# Patient Record
Sex: MALE | Race: WHITE | NOT HISPANIC OR LATINO | Employment: FULL TIME | URBAN - METROPOLITAN AREA
[De-identification: names, ages, dates, MRNs, and addresses within clinical notes are randomized per-mention and may not be internally consistent; named-entity substitution may affect disease eponyms.]

---

## 2017-03-24 ENCOUNTER — RECORDS - HEALTHEAST (OUTPATIENT)
Dept: LAB | Facility: CLINIC | Age: 52
End: 2017-03-24

## 2017-03-24 LAB
CHOLEST SERPL-MCNC: 186 MG/DL
FASTING STATUS PATIENT QL REPORTED: YES
HDLC SERPL-MCNC: 75 MG/DL
LDLC SERPL CALC-MCNC: 98 MG/DL
TRIGL SERPL-MCNC: 65 MG/DL

## 2019-01-18 ENCOUNTER — RECORDS - HEALTHEAST (OUTPATIENT)
Dept: LAB | Facility: CLINIC | Age: 54
End: 2019-01-18

## 2019-01-18 LAB
ALBUMIN SERPL-MCNC: 4.2 G/DL (ref 3.5–5)
ALP SERPL-CCNC: 43 U/L (ref 45–120)
ALT SERPL W P-5'-P-CCNC: 49 U/L (ref 0–45)
ANION GAP SERPL CALCULATED.3IONS-SCNC: 11 MMOL/L (ref 5–18)
AST SERPL W P-5'-P-CCNC: 29 U/L (ref 0–40)
BILIRUB SERPL-MCNC: 1 MG/DL (ref 0–1)
BUN SERPL-MCNC: 15 MG/DL (ref 8–22)
CALCIUM SERPL-MCNC: 10 MG/DL (ref 8.5–10.5)
CHLORIDE BLD-SCNC: 103 MMOL/L (ref 98–107)
CHOLEST SERPL-MCNC: 234 MG/DL
CO2 SERPL-SCNC: 24 MMOL/L (ref 22–31)
CREAT SERPL-MCNC: 1.02 MG/DL (ref 0.7–1.3)
FASTING STATUS PATIENT QL REPORTED: NO
GFR SERPL CREATININE-BSD FRML MDRD: >60 ML/MIN/1.73M2
GLUCOSE BLD-MCNC: 96 MG/DL (ref 70–125)
HDLC SERPL-MCNC: 81 MG/DL
LDLC SERPL CALC-MCNC: 130 MG/DL
POTASSIUM BLD-SCNC: 4.2 MMOL/L (ref 3.5–5)
PROT SERPL-MCNC: 7.6 G/DL (ref 6–8)
SODIUM SERPL-SCNC: 138 MMOL/L (ref 136–145)
TRIGL SERPL-MCNC: 115 MG/DL

## 2019-08-13 ENCOUNTER — RECORDS - HEALTHEAST (OUTPATIENT)
Dept: ADMINISTRATIVE | Facility: OTHER | Age: 54
End: 2019-08-13

## 2019-08-21 ENCOUNTER — HOSPITAL ENCOUNTER (OUTPATIENT)
Dept: CT IMAGING | Facility: CLINIC | Age: 54
Discharge: HOME | End: 2019-08-21
Attending: FAMILY MEDICINE

## 2019-08-21 ENCOUNTER — COMMUNICATION - HEALTHEAST (OUTPATIENT)
Dept: TELEHEALTH | Facility: CLINIC | Age: 54
End: 2019-08-21

## 2019-08-21 DIAGNOSIS — I71.20 THORACIC AORTIC ANEURYSM WITHOUT RUPTURE (H): ICD-10-CM

## 2020-07-22 ENCOUNTER — RECORDS - HEALTHEAST (OUTPATIENT)
Dept: LAB | Facility: CLINIC | Age: 55
End: 2020-07-22

## 2020-07-22 LAB
ALBUMIN SERPL-MCNC: 4.5 G/DL (ref 3.5–5)
ALP SERPL-CCNC: 45 U/L (ref 45–120)
ALT SERPL W P-5'-P-CCNC: 31 U/L (ref 0–45)
ANION GAP SERPL CALCULATED.3IONS-SCNC: 12 MMOL/L (ref 5–18)
AST SERPL W P-5'-P-CCNC: 26 U/L (ref 0–40)
BILIRUB SERPL-MCNC: 0.7 MG/DL (ref 0–1)
BUN SERPL-MCNC: 16 MG/DL (ref 8–22)
CALCIUM SERPL-MCNC: 9.6 MG/DL (ref 8.5–10.5)
CHLORIDE BLD-SCNC: 103 MMOL/L (ref 98–107)
CHOLEST SERPL-MCNC: 238 MG/DL
CO2 SERPL-SCNC: 24 MMOL/L (ref 22–31)
CREAT SERPL-MCNC: 0.95 MG/DL (ref 0.7–1.3)
FASTING STATUS PATIENT QL REPORTED: YES
GFR SERPL CREATININE-BSD FRML MDRD: >60 ML/MIN/1.73M2
GLUCOSE BLD-MCNC: 95 MG/DL (ref 70–125)
HDLC SERPL-MCNC: 99 MG/DL
LDLC SERPL CALC-MCNC: 128 MG/DL
POTASSIUM BLD-SCNC: 4.2 MMOL/L (ref 3.5–5)
PROT SERPL-MCNC: 7.6 G/DL (ref 6–8)
SODIUM SERPL-SCNC: 139 MMOL/L (ref 136–145)
TRIGL SERPL-MCNC: 55 MG/DL

## 2020-08-24 ENCOUNTER — RECORDS - HEALTHEAST (OUTPATIENT)
Dept: LAB | Facility: CLINIC | Age: 55
End: 2020-08-24

## 2020-08-24 LAB
ALBUMIN SERPL-MCNC: 4.4 G/DL (ref 3.5–5)
ALP SERPL-CCNC: 44 U/L (ref 45–120)
ALT SERPL W P-5'-P-CCNC: 33 U/L (ref 0–45)
ANION GAP SERPL CALCULATED.3IONS-SCNC: 12 MMOL/L (ref 5–18)
AST SERPL W P-5'-P-CCNC: 28 U/L (ref 0–40)
BILIRUB SERPL-MCNC: 1 MG/DL (ref 0–1)
BUN SERPL-MCNC: 11 MG/DL (ref 8–22)
CALCIUM SERPL-MCNC: 9.8 MG/DL (ref 8.5–10.5)
CHLORIDE BLD-SCNC: 103 MMOL/L (ref 98–107)
CO2 SERPL-SCNC: 23 MMOL/L (ref 22–31)
CREAT SERPL-MCNC: 0.98 MG/DL (ref 0.7–1.3)
GFR SERPL CREATININE-BSD FRML MDRD: >60 ML/MIN/1.73M2
GLUCOSE BLD-MCNC: 104 MG/DL (ref 70–125)
POTASSIUM BLD-SCNC: 3.9 MMOL/L (ref 3.5–5)
PROT SERPL-MCNC: 7.7 G/DL (ref 6–8)
SODIUM SERPL-SCNC: 138 MMOL/L (ref 136–145)
TSH SERPL DL<=0.005 MIU/L-ACNC: 47.51 UIU/ML (ref 0.3–5)

## 2020-08-25 ENCOUNTER — RECORDS - HEALTHEAST (OUTPATIENT)
Dept: LAB | Facility: CLINIC | Age: 55
End: 2020-08-25

## 2020-08-25 LAB
T3FREE SERPL-MCNC: 2.1 PG/ML (ref 1.9–3.9)
T4 FREE SERPL-MCNC: 0.5 NG/DL (ref 0.7–1.8)

## 2020-11-04 ENCOUNTER — RECORDS - HEALTHEAST (OUTPATIENT)
Dept: LAB | Facility: CLINIC | Age: 55
End: 2020-11-04

## 2020-11-04 LAB
ALBUMIN SERPL-MCNC: 4.3 G/DL (ref 3.5–5)
ALP SERPL-CCNC: 41 U/L (ref 45–120)
ALT SERPL W P-5'-P-CCNC: 55 U/L (ref 0–45)
ANION GAP SERPL CALCULATED.3IONS-SCNC: 11 MMOL/L (ref 5–18)
AST SERPL W P-5'-P-CCNC: 32 U/L (ref 0–40)
BILIRUB SERPL-MCNC: 0.8 MG/DL (ref 0–1)
BUN SERPL-MCNC: 16 MG/DL (ref 8–22)
CALCIUM SERPL-MCNC: 9.8 MG/DL (ref 8.5–10.5)
CHLORIDE BLD-SCNC: 102 MMOL/L (ref 98–107)
CO2 SERPL-SCNC: 27 MMOL/L (ref 22–31)
CREAT SERPL-MCNC: 0.98 MG/DL (ref 0.7–1.3)
GFR SERPL CREATININE-BSD FRML MDRD: >60 ML/MIN/1.73M2
GLUCOSE BLD-MCNC: 104 MG/DL (ref 70–125)
POTASSIUM BLD-SCNC: 4.4 MMOL/L (ref 3.5–5)
PROT SERPL-MCNC: 7.5 G/DL (ref 6–8)
SODIUM SERPL-SCNC: 140 MMOL/L (ref 136–145)
TSH SERPL DL<=0.005 MIU/L-ACNC: 0.1 UIU/ML (ref 0.3–5)

## 2020-12-16 ENCOUNTER — RECORDS - HEALTHEAST (OUTPATIENT)
Dept: LAB | Facility: CLINIC | Age: 55
End: 2020-12-16

## 2020-12-16 LAB — TSH SERPL DL<=0.005 MIU/L-ACNC: 0.36 UIU/ML (ref 0.3–5)

## 2021-05-25 ENCOUNTER — RECORDS - HEALTHEAST (OUTPATIENT)
Dept: ADMINISTRATIVE | Facility: CLINIC | Age: 56
End: 2021-05-25

## 2021-06-01 ENCOUNTER — RECORDS - HEALTHEAST (OUTPATIENT)
Dept: ADMINISTRATIVE | Facility: CLINIC | Age: 56
End: 2021-06-01

## 2021-06-09 ENCOUNTER — RECORDS - HEALTHEAST (OUTPATIENT)
Dept: LAB | Facility: CLINIC | Age: 56
End: 2021-06-09

## 2021-06-09 LAB — TSH SERPL DL<=0.005 MIU/L-ACNC: 0.71 UIU/ML (ref 0.3–5)

## 2021-06-20 ENCOUNTER — HEALTH MAINTENANCE LETTER (OUTPATIENT)
Age: 56
End: 2021-06-20

## 2021-10-11 ENCOUNTER — HEALTH MAINTENANCE LETTER (OUTPATIENT)
Age: 56
End: 2021-10-11

## 2022-03-25 ENCOUNTER — LAB REQUISITION (OUTPATIENT)
Dept: LAB | Facility: CLINIC | Age: 57
End: 2022-03-25

## 2022-03-25 DIAGNOSIS — E78.5 HYPERLIPIDEMIA, UNSPECIFIED: ICD-10-CM

## 2022-03-25 DIAGNOSIS — E03.9 HYPOTHYROIDISM, UNSPECIFIED: ICD-10-CM

## 2022-03-25 LAB
ALBUMIN SERPL-MCNC: 4 G/DL (ref 3.5–5)
ALP SERPL-CCNC: 39 U/L (ref 45–120)
ALT SERPL W P-5'-P-CCNC: 62 U/L (ref 0–45)
ANION GAP SERPL CALCULATED.3IONS-SCNC: 13 MMOL/L (ref 5–18)
AST SERPL W P-5'-P-CCNC: 39 U/L (ref 0–40)
BILIRUB SERPL-MCNC: 0.9 MG/DL (ref 0–1)
BUN SERPL-MCNC: 11 MG/DL (ref 8–22)
CALCIUM SERPL-MCNC: 9.8 MG/DL (ref 8.5–10.5)
CHLORIDE BLD-SCNC: 102 MMOL/L (ref 98–107)
CHOLEST SERPL-MCNC: 199 MG/DL
CO2 SERPL-SCNC: 24 MMOL/L (ref 22–31)
CREAT SERPL-MCNC: 0.94 MG/DL (ref 0.7–1.3)
FASTING STATUS PATIENT QL REPORTED: NORMAL
GFR SERPL CREATININE-BSD FRML MDRD: >90 ML/MIN/1.73M2
GLUCOSE BLD-MCNC: 100 MG/DL (ref 70–125)
HDLC SERPL-MCNC: 73 MG/DL
LDLC SERPL CALC-MCNC: 111 MG/DL
POTASSIUM BLD-SCNC: 3.7 MMOL/L (ref 3.5–5)
PROT SERPL-MCNC: 7.3 G/DL (ref 6–8)
SODIUM SERPL-SCNC: 139 MMOL/L (ref 136–145)
TRIGL SERPL-MCNC: 74 MG/DL
TSH SERPL DL<=0.005 MIU/L-ACNC: 2.07 UIU/ML (ref 0.3–5)

## 2022-03-25 PROCEDURE — 80053 COMPREHEN METABOLIC PANEL: CPT | Performed by: PHYSICIAN ASSISTANT

## 2022-03-25 PROCEDURE — 84443 ASSAY THYROID STIM HORMONE: CPT | Performed by: PHYSICIAN ASSISTANT

## 2022-03-25 PROCEDURE — 80061 LIPID PANEL: CPT | Performed by: PHYSICIAN ASSISTANT

## 2022-04-13 ENCOUNTER — OFFICE VISIT (OUTPATIENT)
Dept: CARDIOLOGY | Facility: CLINIC | Age: 57
End: 2022-04-13
Payer: COMMERCIAL

## 2022-04-13 VITALS
SYSTOLIC BLOOD PRESSURE: 114 MMHG | HEART RATE: 64 BPM | HEIGHT: 67 IN | WEIGHT: 241.8 LBS | BODY MASS INDEX: 37.95 KG/M2 | DIASTOLIC BLOOD PRESSURE: 82 MMHG | RESPIRATION RATE: 12 BRPM

## 2022-04-13 DIAGNOSIS — I71.20 THORACIC AORTIC ANEURYSM WITHOUT RUPTURE (H): Primary | ICD-10-CM

## 2022-04-13 PROCEDURE — 99204 OFFICE O/P NEW MOD 45 MIN: CPT | Performed by: INTERNAL MEDICINE

## 2022-04-13 RX ORDER — MULTIVIT WITH MINERALS/LUTEIN
1000 TABLET ORAL DAILY
COMMUNITY
Start: 2021-01-01

## 2022-04-13 RX ORDER — METOPROLOL SUCCINATE 25 MG/1
25 TABLET, EXTENDED RELEASE ORAL DAILY
Qty: 30 TABLET | Refills: 3 | Status: SHIPPED | OUTPATIENT
Start: 2022-04-13 | End: 2022-07-25

## 2022-04-13 RX ORDER — LEVOTHYROXINE SODIUM 125 UG/1
125 TABLET ORAL DAILY
COMMUNITY
Start: 2022-03-25

## 2022-04-13 RX ORDER — HYDROXYZINE HYDROCHLORIDE 50 MG/1
50 TABLET, FILM COATED ORAL PRN
COMMUNITY
Start: 2022-03-25

## 2022-04-13 RX ORDER — DIMENHYDRINATE 50 MG
100 TABLET ORAL DAILY
COMMUNITY

## 2022-04-13 RX ORDER — SIMVASTATIN 40 MG
40 TABLET ORAL DAILY
COMMUNITY
Start: 2022-03-25

## 2022-04-13 RX ORDER — HYDROCHLOROTHIAZIDE 12.5 MG/1
12.5 TABLET ORAL DAILY
COMMUNITY
Start: 2022-03-25

## 2022-04-13 RX ORDER — CHOLECALCIFEROL (VITAMIN D3) 50 MCG
2000 CAPSULE ORAL DAILY
COMMUNITY
Start: 2021-01-01

## 2022-04-13 NOTE — PATIENT INSTRUCTIONS
Raciel JUNG Dejuan,     It was a pleasure to see you in the office today. My recommendations for you include:   1. echocardiogram  2. Start low dose beta blocker toprol  3. Referral to surgeon     Please do not hesitate to call the [facility] clinic with any questions or concerns at [phone].    Sincerely,     Cristiana Jean MD

## 2022-04-13 NOTE — LETTER
4/13/2022    Sean Mendes PA-C  [facility] [address] [place] [address] [address] [place] [address]    RE: Raciel Zaidi       Dear Colleague,     I had the pleasure of seeing Raciel Zaidi in the [facility].    HEART CARE ENCOUNTER CONSULTATON NOTE      [facility]  [phone]      Assessment/Recommendations   Assessment:  1.  Ascending aortic aneurysm: Now measuring over 5.5 cm in certain areas with a significant increase in size in comparison to prior CT chest done in 2019.  2.  Hypertension: Fairly well controlled however would like tighter control given his aneurysm and will add beta-blocker      Plan:  1.  Need to obtain the CT report from outside radiology for review  2.  Referral to CV surgery for evaluation and management.  May need to consider repair of aneurysm at this time given increase in size over the past couple years  3.  Echocardiogram to evaluate for valvular disease  4.  Add Toprol-XL 25 mg daily  5.  Avoid strenuous exercise       History of Present Illness/Subjective    HPI: Raciel Zaidi is a 56 year old male with history of ascending aortic aneurysm, hypertension, obesity, hypothyroidism and dyslipidemia who I am seeing today for initial consultation due to an abnormal CT chest.  I did not have the report available to me during the visit however the patient was able to access it on his phone.  He had the CT scan done a few years ago and they compared it to one that was done in 2017.  This was done at [facility] radiology in [place].   Mid ascending aorta diameter was 5.4 x 4.7 cm (previous 4.8 x 4.6), proximal ascending 5.3 x 5.7 cm (previous 4.2 x 5.3).  No known aortic valvular disease he does not exercise much.  No exertional symptoms.  His blood pressure is fairly well controlled on a low-dose of hydrochlorothiazide.         Physical Examination  Review of Systems   Vitals: /82 (BP Location: Left arm, Patient Position: Sitting, Cuff Size: Adult  "Large)   Pulse 64   Resp 12   Ht 1.702 m (5' 7\")   Wt 109.7 kg (241 lb 12.8 oz)   BMI 37.87 kg/m    BMI= Body mass index is 37.87 kg/m .  Wt Readings from Last 3 Encounters:   04/13/22 109.7 kg (241 lb 12.8 oz)       General Appearance:   no distress, normal body habitus   ENT/Mouth: membranes moist, no oral lesions or bleeding gums.      EYES:  no scleral icterus, normal conjunctivae   Neck: no carotid bruits or thyromegaly   Chest/Lungs:   lungs are clear to auscultation, no rales or wheezing   Cardiovascular:   Regular. Normal first and second heart sounds with no murmurs, rubs, or gallops; the carotid, radial and posterior tibial pulses are intact,  no edema bilaterally    Abdomen:  no organomegaly, masses, bruits, or tenderness; bowel sounds are present   Extremities: no cyanosis or clubbing   Skin: no xanthelasma, warm.    Neurologic: normal  bilateral, no tremors     Psychiatric: alert and oriented x3, calm        Please refer above for cardiac ROS details.        Medical History  Surgical History Family History Social History   Hypertension     obesity     Aortic aneurysm No past surgical history on file.    Maternal grandfather had heart attack in his 60s, paternal grandfather heart disease in 70s.  Mother CVA in her 60s   Social History     Socioeconomic History     Marital status:      Spouse name: Not on file     Number of children: Not on file     Years of education: Not on file     Highest education level: Not on file   Occupational History     Not on file   Tobacco Use     Smoking status: Never Smoker     Smokeless tobacco: Never Used   Substance and Sexual Activity     Alcohol use: Not on file     Drug use: Never     Sexual activity: Not on file   Other Topics Concern     Not on file   Social History Narrative     Not on file     Social Determinants of Health     Financial Resource Strain: Not on file   Food Insecurity: Not on file   Transportation Needs: Not on file   Physical " Activity: Not on file   Stress: Not on file   Social Connections: Not on file   Intimate Partner Violence: Not on file   Housing Stability: Not on file           Medications  Allergies   Current Outpatient Medications   Medication Sig Dispense Refill     cholecalciferol (D3 SUPER STRENGTH) 50 MCG (2000 UT) CAPS Take 2,000 Units by mouth daily       Cod Liver Oil 1000 MG CAPS Take 1,000 mg by mouth daily       coenzyme Q-10 100 MG CAPS Take 100 mg by mouth daily       hydrochlorothiazide (HYDRODIURIL) 12.5 MG tablet Take 12.5 mg by mouth daily       hydrOXYzine (ATARAX) 50 MG tablet Take 50 mg by mouth as needed       levothyroxine (SYNTHROID/LEVOTHROID) 125 MCG tablet Take 125 mcg by mouth daily       metoprolol succinate ER (TOPROL XL) 25 MG 24 hr tablet Take 1 tablet (25 mg) by mouth daily 30 tablet 3     Multiple Vitamin (MULTI VITAMIN) TABS Take 1 tablet by mouth daily       MULTIVIT &MINERALS/FERROUS FUM (MULTI VITAMIN ORAL) [MULTIVIT &MINERALS/FERROUS FUM (MULTI VITAMIN ORAL)] Take by mouth.       simvastatin (ZOCOR) 40 MG tablet Take 40 mg by mouth daily       vitamin C (ASCORBIC ACID) 1000 MG TABS Take 1,000 mg by mouth daily       zolpidem (AMBIEN) 10 mg tablet [ZOLPIDEM (AMBIEN) 10 MG TABLET] Take 10 mg by mouth bedtime as needed for sleep.         Allergies   Allergen Reactions     Cat Dander [Animal Dander] Other (See Comments)     Itchy eyes, runny nose, scratchy throat.     Ragweed [Ragweeds] Other (See Comments)     Itchy eyes, runny nose, scratchy throat.          Lab Results    Chemistry/lipid CBC Cardiac Enzymes/BNP/TSH/INR   Recent Labs   Lab Test 03/25/22  1116   CHOL 199   HDL 73      TRIG 74     Recent Labs   Lab Test 03/25/22  1116 07/22/20  0901 01/18/19  1131    128 130*     Recent Labs   Lab Test 03/25/22  1116      POTASSIUM 3.7   CHLORIDE 102   CO2 24      BUN 11   CR 0.94   GFRESTIMATED >90   JONATHAN 9.8     Recent Labs   Lab Test 03/25/22  1116 11/04/20  1031  08/24/20  0856   CR 0.94 0.98 0.98     No results for input(s): A1C in the last 41072 hours.       No results for input(s): WBC, HGB, HCT, MCV, PLT in the last 15271 hours.  No results for input(s): HGB in the last 64214 hours. No results for input(s): TROPONINI in the last 37681 hours.  No results for input(s): BNP, NTBNPI, NTBNP in the last 00074 hours.  Recent Labs   Lab Test 03/25/22  1116   TSH 2.07     No results for input(s): INR in the last 66073 hours.         Thank you for allowing me to participate in the care of your patient.    Sincerely,     Cristiana Jean MD     [facility]

## 2022-04-13 NOTE — PROGRESS NOTES
"  HEART CARE ENCOUNTER CONSULTATON NOTE      [facility]  [phone]      Assessment/Recommendations   Assessment:  1.  Ascending aortic aneurysm: Now measuring over 5.5 cm in certain areas with a significant increase in size in comparison to prior CT chest done in 2019.  2.  Hypertension: Fairly well controlled however would like tighter control given his aneurysm and will add beta-blocker      Plan:  1.  Need to obtain the CT report from outside radiology for review  2.  Referral to CV surgery for evaluation and management.  May need to consider repair of aneurysm at this time given increase in size over the past couple years  3.  Echocardiogram to evaluate for valvular disease  4.  Add Toprol-XL 25 mg daily  5.  Avoid strenuous exercise       History of Present Illness/Subjective    HPI: Raciel Zaidi is a 56 year old male with history of ascending aortic aneurysm, hypertension, obesity, hypothyroidism and dyslipidemia who I am seeing today for initial consultation due to an abnormal CT chest.  I did not have the report available to me during the visit however the patient was able to access it on his phone.  He had the CT scan done a few years ago and they compared it to one that was done in 2017.  [facility] [place].   Mid ascending aorta diameter was 5.4 x 4.7 cm (previous 4.8 x 4.6), proximal ascending 5.3 x 5.7 cm (previous 4.2 x 5.3).  No known aortic valvular disease he does not exercise much.  No exertional symptoms.  His blood pressure is fairly well controlled on a low-dose of hydrochlorothiazide.         Physical Examination  Review of Systems   Vitals: /82 (BP Location: Left arm, Patient Position: Sitting, Cuff Size: Adult Large)   Pulse 64   Resp 12   Ht 1.702 m (5' 7\")   Wt 109.7 kg (241 lb 12.8 oz)   BMI 37.87 kg/m    BMI= Body mass index is 37.87 kg/m .  Wt Readings from Last 3 Encounters:   04/13/22 109.7 kg (241 lb 12.8 oz)       General " Appearance:   no distress, normal body habitus   ENT/Mouth: membranes moist, no oral lesions or bleeding gums.      EYES:  no scleral icterus, normal conjunctivae   Neck: no carotid bruits or thyromegaly   Chest/Lungs:   lungs are clear to auscultation, no rales or wheezing   Cardiovascular:   Regular. Normal first and second heart sounds with no murmurs, rubs, or gallops; the carotid, radial and posterior tibial pulses are intact,  no edema bilaterally    Abdomen:  no organomegaly, masses, bruits, or tenderness; bowel sounds are present   Extremities: no cyanosis or clubbing   Skin: no xanthelasma, warm.    Neurologic: normal  bilateral, no tremors     Psychiatric: alert and oriented x3, calm        Please refer above for cardiac ROS details.        Medical History  Surgical History Family History Social History   Hypertension     obesity     Aortic aneurysm No past surgical history on file.    Maternal grandfather had heart attack in his 60s, paternal grandfather heart disease in 70s.  Mother CVA in her 60s   Social History     Socioeconomic History     Marital status:      Spouse name: Not on file     Number of children: Not on file     Years of education: Not on file     Highest education level: Not on file   Occupational History     Not on file   Tobacco Use     Smoking status: Never Smoker     Smokeless tobacco: Never Used   Substance and Sexual Activity     Alcohol use: Not on file     Drug use: Never     Sexual activity: Not on file   Other Topics Concern     Not on file   Social History Narrative     Not on file     Social Determinants of Health     Financial Resource Strain: Not on file   Food Insecurity: Not on file   Transportation Needs: Not on file   Physical Activity: Not on file   Stress: Not on file   Social Connections: Not on file   Intimate Partner Violence: Not on file   Housing Stability: Not on file           Medications  Allergies   Current Outpatient Medications   Medication Sig  Dispense Refill     cholecalciferol (D3 SUPER STRENGTH) 50 MCG (2000 UT) CAPS Take 2,000 Units by mouth daily       Cod Liver Oil 1000 MG CAPS Take 1,000 mg by mouth daily       coenzyme Q-10 100 MG CAPS Take 100 mg by mouth daily       hydrochlorothiazide (HYDRODIURIL) 12.5 MG tablet Take 12.5 mg by mouth daily       hydrOXYzine (ATARAX) 50 MG tablet Take 50 mg by mouth as needed       levothyroxine (SYNTHROID/LEVOTHROID) 125 MCG tablet Take 125 mcg by mouth daily       metoprolol succinate ER (TOPROL XL) 25 MG 24 hr tablet Take 1 tablet (25 mg) by mouth daily 30 tablet 3     Multiple Vitamin (MULTI VITAMIN) TABS Take 1 tablet by mouth daily       MULTIVIT &MINERALS/FERROUS FUM (MULTI VITAMIN ORAL) [MULTIVIT &MINERALS/FERROUS FUM (MULTI VITAMIN ORAL)] Take by mouth.       simvastatin (ZOCOR) 40 MG tablet Take 40 mg by mouth daily       vitamin C (ASCORBIC ACID) 1000 MG TABS Take 1,000 mg by mouth daily       zolpidem (AMBIEN) 10 mg tablet [ZOLPIDEM (AMBIEN) 10 MG TABLET] Take 10 mg by mouth bedtime as needed for sleep.         Allergies   Allergen Reactions     Cat Dander [Animal Dander] Other (See Comments)     Itchy eyes, runny nose, scratchy throat.     Ragweed [Ragweeds] Other (See Comments)     Itchy eyes, runny nose, scratchy throat.          Lab Results    Chemistry/lipid CBC Cardiac Enzymes/BNP/TSH/INR   Recent Labs   Lab Test 03/25/22  1116   CHOL 199   HDL 73      TRIG 74     Recent Labs   Lab Test 03/25/22  1116 07/22/20  0901 01/18/19  1131    128 130*     Recent Labs   Lab Test 03/25/22  1116      POTASSIUM 3.7   CHLORIDE 102   CO2 24      BUN 11   CR 0.94   GFRESTIMATED >90   JONATHAN 9.8     Recent Labs   Lab Test 03/25/22  1116 11/04/20  1031 08/24/20  0856   CR 0.94 0.98 0.98     No results for input(s): A1C in the last 36718 hours.       No results for input(s): WBC, HGB, HCT, MCV, PLT in the last 97497 hours.  No results for input(s): HGB in the last 40467 hours. No results  for input(s): TROPONINI in the last 19476 hours.  No results for input(s): BNP, NTBNPI, NTBNP in the last 35348 hours.  Recent Labs   Lab Test 03/25/22  1116   TSH 2.07     No results for input(s): INR in the last 51209 hours.     Cristiana Jean MD

## 2022-04-25 ENCOUNTER — HOSPITAL ENCOUNTER (OUTPATIENT)
Dept: CARDIOLOGY | Facility: CLINIC | Age: 57
Discharge: HOME | End: 2022-04-25
Attending: INTERNAL MEDICINE | Admitting: INTERNAL MEDICINE
Payer: COMMERCIAL

## 2022-04-25 DIAGNOSIS — I71.20 THORACIC AORTIC ANEURYSM WITHOUT RUPTURE (H): ICD-10-CM

## 2022-04-25 LAB — LVEF ECHO: NORMAL

## 2022-04-25 PROCEDURE — 999N000208 ECHOCARDIOGRAM COMPLETE

## 2022-04-25 PROCEDURE — 93306 TTE W/DOPPLER COMPLETE: CPT | Mod: 26 | Performed by: INTERNAL MEDICINE

## 2022-04-25 PROCEDURE — 255N000002 HC RX 255 OP 636: Performed by: INTERNAL MEDICINE

## 2022-04-25 RX ADMIN — PERFLUTREN 4 ML: 6.52 INJECTION, SUSPENSION INTRAVENOUS at 09:52

## 2022-04-28 ENCOUNTER — OFFICE VISIT (OUTPATIENT)
Dept: CARDIOLOGY | Facility: CLINIC | Age: 57
End: 2022-04-28
Attending: INTERNAL MEDICINE

## 2022-04-28 VITALS
HEART RATE: 72 BPM | SYSTOLIC BLOOD PRESSURE: 142 MMHG | RESPIRATION RATE: 16 BRPM | WEIGHT: 241 LBS | BODY MASS INDEX: 37.75 KG/M2 | DIASTOLIC BLOOD PRESSURE: 78 MMHG

## 2022-04-28 DIAGNOSIS — I71.20 THORACIC AORTIC ANEURYSM WITHOUT RUPTURE (H): ICD-10-CM

## 2022-04-28 PROCEDURE — 99203 OFFICE O/P NEW LOW 30 MIN: CPT | Performed by: THORACIC SURGERY (CARDIOTHORACIC VASCULAR SURGERY)

## 2022-04-28 RX ORDER — OMEGA-3/DHA/EPA/FISH OIL/KRILL 339-314 MG
1 CAPSULE ORAL DAILY
COMMUNITY
Start: 2021-01-01

## 2022-04-28 NOTE — PROGRESS NOTES
ASKED BY REFERRING PHYSICIAN: Dr. Jean to evaluate this patient's ascending aortic aneurysm.    CHIEF COMPLAINT: Big aorta    HPI: Raciel is a 56 year old male who presents with a documented enlarging ascending aorta.  In 2019 it was 4.8 by 4.6 cm.  It is notw 5.3 by 5.7 cm.  He has hypertension, obesity, hypothyroidism and dyslipidemia.  He does not exercise very much. He denies any chest pain or back pain other than low back pain that has been treated by a chiropractor.    PAST MEDICAL HISTORY:  Ascending aortic aneurysm  Hypertension  Obesity  Dyslipidemia  Hypothyroidism    PAST SURGICAL HISTORY:  None    FAMILY HISTORY:   A few people have  of what was called a heart attack.    SOCIAL HISTORY:  Social History     Socioeconomic History     Marital status:      Spouse name: Not on file     Number of children: Not on file     Years of education: Not on file     Highest education level: Not on file   Occupational History     Not on file   Tobacco Use     Smoking status: Never Smoker     Smokeless tobacco: Never Used   Substance and Sexual Activity     Alcohol use: Not on file     Drug use: Never     Sexual activity: Not on file   Other Topics Concern     Not on file   Social History Narrative     Not on file     Social Determinants of Health     Financial Resource Strain: Not on file   Food Insecurity: Not on file   Transportation Needs: Not on file   Physical Activity: Not on file   Stress: Not on file   Social Connections: Not on file   Intimate Partner Violence: Not on file   Housing Stability: Not on file        ALLERGIES:   Allergies   Allergen Reactions     Cat Dander [Animal Dander] Other (See Comments)     Itchy eyes, runny nose, scratchy throat.     Ragweed [Ragweeds] Other (See Comments)     Itchy eyes, runny nose, scratchy throat.       CURRENT MEDICATIONS:   Prescription Medications as of 2022       Rx Number Disp Refills Start End Last Dispensed Date Next Fill Date Owning Pharmacy     cholecalciferol (D3 SUPER STRENGTH) 50 MCG (2000 UT) CAPS    1/1/2021    [facility] [address] [place] [address] [address] [facility]    Sig: Take 2,000 Units by mouth daily    Class: Historical    Route: Oral    Cod Liver Oil 1000 MG CAPS    1/1/2021    [facility] [place] [address] [address] [facility]    Sig: Take 1,000 mg by mouth daily    Class: Historical    Route: Oral    coenzyme Q-10 100 MG CAPS        [facility] [address] [place] [address] [address] [facility]    Sig: Take 100 mg by mouth daily    Class: Historical    Route: Oral    Fish Oil-Krill Oil (MEGARED ADVANCED 4 IN 1) CAPS    1/1/2021    [facility] [place] [address] [address] [facility]    Sig: Take 1 capsule by mouth daily    Class: Historical    Route: Oral    hydrochlorothiazide (HYDRODIURIL) 12.5 MG tablet    3/25/2022    [facility] [place] [address] [address] [facility]    Sig: Take 12.5 mg by mouth daily    Class: Historical    Route: Oral    hydrOXYzine (ATARAX) 50 MG tablet    3/25/2022    [facility] [place] [address] [address] [facility]    Sig: Take 50 mg by mouth as needed    Class: Historical    Route: Oral    levothyroxine (SYNTHROID/LEVOTHROID) 125 MCG tablet    3/25/2022    [facility] [address] [place] [address] [address] [facility]    Sig: Take 125 mcg by mouth daily    Class: Historical    Route: Oral    metoprolol succinate ER (TOPROL XL) 25 MG 24 hr tablet  30 tablet 3 4/13/2022    [facility] [place] [address] [address] [facility]    Sig: Take 1 tablet (25 mg) by mouth daily    Class: E-Prescribe    Route: Oral    Multiple Vitamin (MULTI VITAMIN) TABS        [id number] - [place], [place] - [address]   AT [facility]    Sig: Take 1 tablet by mouth daily    Class: Historical    Route: Oral    simvastatin (ZOCOR) 40 MG tablet    3/25/2022    [facility] #[id number] [place] [address] [address] [facility]    Sig: Take 40 mg by mouth daily    Class: Historical    Route: Oral    vitamin C (ASCORBIC ACID) 1000 MG TABS    1/1/2021    [facility] #[id number] [place], [place] - [address] [facility]    Sig: Take 1,000 mg by mouth daily    Class: Historical    Route: Oral    zolpidem (AMBIEN) 10 mg tablet    8/24/2015        Sig: [ZOLPIDEM (AMBIEN) 10 MG TABLET] Take 10 mg by mouth bedtime as needed for sleep.    Class: Historical    Route: Oral          REVIEW OF SYSTEMS:   Gen: denies frequent headaches, double/blurry vision, insomnia, fatigue, unexplained weight loss/gain. No previous anesthesia reactions.  CV: denies chest pain, shortness of breath, palpitations, peripheral edema.    Pulm: denies shortness of breath, asthma or wheezing  GI/: denies liver or kidney problems, blood in stool or BRBPR, difficulty urinating  Endo: denies thyroid problems or Diabetes  Heme/Onc: denies bleeding or clotting disorders, no family problems with bleeding/clotting diorders  MS: no weakness, tremors or gait instability  Neuro: denies depression, memory problems, no dysesthesias, no previous strokes, no migraines, no dysphagia  Skin: No petechiae, purpura or rash.    PHYSICAL EXAMINATION:   BP (!) 142/78 (BP Location: Left arm, Patient Position: Sitting, Cuff Size: Adult Large)   Pulse 72   Resp 16   Wt 109.3 kg (241 lb)   BMI 37.75 kg/m    General: alert and oriented x 3, pleasant, no acute distress  CV: S1 S2, no murmurs, rubs or gallops, regular rate and rhythm, no peripheral edema, no carotid or abdominal bruits, pulses in upper and lower extremities palpable  Pulm: bilateral breath sounds, clear to auscultation, easy work of breathing  GI: (+)  bowel sounds, soft non-tender and non-distended  : voiding without problems  MS: moves all extremities x 4,  5+/5+ equal strength bilaterally  Neuro: pupils equal round and reactive to light, cranial nerves, II-XII grossly intact, no gross neurologic deficits noted    LABS: Pending    PROCEDURES/IMAGING:  Chest X-Ray: Not done  Angio: Pending  Echo: No aortic stenosis or aortic insufficiency  CT: Enlarging ascending aorta  MRI: Not done  Carotid US: Not done     ASSESSMENT/PLAN:   Raciel is a 56 year old gentleman with an enlarging ascending aortic aneurysm.  We do not have a copy of his most recent CT.  I believe that he would benefit from replacement of his ascending aorta with an interposition graft.  He understands that the risks for this procedure include: bleeding, infection, stroke sternal dehiscence, myocardial infarction, arrhythmias, reoperation, pneumonia, prolonged ventilation, liver/renal failure, aortic dissection, and an operative mortality of 2 to 4 percent.  He accepts these risks and is agreeable with the plan of having the surgery, but wants to wait until after his son's wedding on June 22, 2022.    1. Coronary angiography as an outpatient  2. Replacement of ascending aorta on 7-6-2022     Approximately 35 minutes were spent with the patient in clinic at this visit.    CC  Patient Care Team:  Sean Mendes PA-C as PCP - Cristiana Gomez MD as Assigned Heart and Vascular Provider  SELF, REFERRED

## 2022-04-28 NOTE — LETTER
2022    Sean Mendes PA-C  [address] [place] [address]    RE: Raciel Zaidi       Dear Colleague,     I had the pleasure of seeing Raciel Zaidi in the [facility].  ASKED BY REFERRING PHYSICIAN: Dr. Jean to evaluate this patient's ascending aortic aneurysm.    CHIEF COMPLAINT: Big aorta    HPI: Raciel is a 56 year old male who presents with a documented enlarging ascending aorta.  In 2019 it was 4.8 by 4.6 cm.  It is notw 5.3 by 5.7 cm.  He has hypertension, obesity, hypothyroidism and dyslipidemia.  He does not exercise very much. He denies any chest pain or back pain other than low back pain that has been treated by a chiropractor.    PAST MEDICAL HISTORY:  Ascending aortic aneurysm  Hypertension  Obesity  Dyslipidemia  Hypothyroidism    PAST SURGICAL HISTORY:  None    FAMILY HISTORY:   A few people have  of what was called a heart attack.    SOCIAL HISTORY:  Social History     Socioeconomic History     Marital status:      Spouse name: Not on file     Number of children: Not on file     Years of education: Not on file     Highest education level: Not on file   Occupational History     Not on file   Tobacco Use     Smoking status: Never Smoker     Smokeless tobacco: Never Used   Substance and Sexual Activity     Alcohol use: Not on file     Drug use: Never     Sexual activity: Not on file   Other Topics Concern     Not on file   Social History Narrative     Not on file     Social Determinants of Health     Financial Resource Strain: Not on file   Food Insecurity: Not on file   Transportation Needs: Not on file   Physical Activity: Not on file   Stress: Not on file   Social Connections: Not on file   Intimate Partner Violence: Not on file   Housing Stability: Not on file        ALLERGIES:   Allergies   Allergen Reactions     Cat Dander [Animal Dander] Other (See Comments)     Itchy eyes, runny nose, scratchy throat.     Ragweed [Ragweeds]  Other (See Comments)     Itchy eyes, runny nose, scratchy throat.       CURRENT MEDICATIONS:   Prescription Medications as of 4/28/2022       Rx Number Disp Refills Start End Last Dispensed Date Next Fill Date Owning Pharmacy    cholecalciferol (D3 SUPER STRENGTH) 50 MCG (2000 UT) CAPS    1/1/2021    [facility] ST[facility] [address] [place] [address] [address] AT [facility]    Sig: Take 2,000 Units by mouth daily    Class: Historical    Route: Oral    Cod Liver Oil 1000 MG CAPS    1/1/2021    [facility] ST[facility] [address] [place] [address] [address] AT [facility]    Sig: Take 1,000 mg by mouth daily    Class: Historical    Route: Oral    coenzyme Q-10 100 MG CAPS        [facility] STORE [address] [place] [address] [address] AT [facility]    Sig: Take 100 mg by mouth daily    Class: Historical    Route: Oral    Fish Oil-Krill Oil (MEGARED ADVANCED 4 IN 1) CAPS    1/1/2021    [facility] ST[facility] [address] [place] [address] [address] AT [facility]    Sig: Take 1 capsule by mouth daily    Class: Historical    Route: Oral    hydrochlorothiazide (HYDRODIURIL) 12.5 MG tablet    3/25/2022    [facility] [address] [place] [address] [address] AT [facility]    Sig: Take 12.5 mg by mouth daily    Class: Historical    Route: Oral    hydrOXYzine (ATARAX) 50 MG tablet    3/25/2022    [facility] ST[facility] [address] [place] [address] [address] AT [facility]    Sig: Take 50 mg by mouth as needed    Class: Historical    Route: Oral    levothyroxine (SYNTHROID/LEVOTHROID) 125 MCG tablet    3/25/2022    [facility] [address] [place] [address] [address] AT [facility]    Sig: Take 125 mcg by mouth daily    Class: Historical    Route: Oral    metoprolol succinate ER (TOPROL XL) 25 MG 24 hr tablet  30 tablet 3 4/13/2022    [id number] - [place], [place] - [facility]  [address] AT [facility]    Sig: Take 1 tablet (25 mg) by mouth daily    Class: E-Prescribe    Route: Oral    Multiple Vitamin (MULTI VITAMIN) TABS        [facility] [address] [place] [address] [address] AT [facility]    Sig: Take 1 tablet by mouth daily    Class: Historical    Route: Oral    simvastatin (ZOCOR) 40 MG tablet    3/25/2022    [facility] #[id number] [place] [address] 1967 [address] AT [facility]    Sig: Take 40 mg by mouth daily    Class: Historical    Route: Oral    vitamin C (ASCORBIC ACID) 1000 MG TABS    1/1/2021    [facility] [id number] [place] [address] 1967 [address] AT [facility]    Sig: Take 1,000 mg by mouth daily    Class: Historical    Route: Oral    zolpidem (AMBIEN) 10 mg tablet    8/24/2015        Sig: [ZOLPIDEM (AMBIEN) 10 MG TABLET] Take 10 mg by mouth bedtime as needed for sleep.    Class: Historical    Route: Oral          REVIEW OF SYSTEMS:   Gen: denies frequent headaches, double/blurry vision, insomnia, fatigue, unexplained weight loss/gain. No previous anesthesia reactions.  CV: denies chest pain, shortness of breath, palpitations, peripheral edema.    Pulm: denies shortness of breath, asthma or wheezing  GI/: denies liver or kidney problems, blood in stool or BRBPR, difficulty urinating  Endo: denies thyroid problems or Diabetes  Heme/Onc: denies bleeding or clotting disorders, no family problems with bleeding/clotting diorders  MS: no weakness, tremors or gait instability  Neuro: denies depression, memory problems, no dysesthesias, no previous strokes, no migraines, no dysphagia  Skin: No petechiae, purpura or rash.    PHYSICAL EXAMINATION:   BP (!) 142/78 (BP Location: Left arm, Patient Position: Sitting, Cuff Size: Adult Large)   Pulse 72   Resp 16   Wt 109.3 kg (241 lb)   BMI 37.75 kg/m    General: alert and oriented x 3, pleasant, no acute distress  CV: S1 S2, no  murmurs, rubs or gallops, regular rate and rhythm, no peripheral edema, no carotid or abdominal bruits, pulses in upper and lower extremities palpable  Pulm: bilateral breath sounds, clear to auscultation, easy work of breathing  GI: (+) bowel sounds, soft non-tender and non-distended  : voiding without problems  MS: moves all extremities x 4,  5+/5+ equal strength bilaterally  Neuro: pupils equal round and reactive to light, cranial nerves, II-XII grossly intact, no gross neurologic deficits noted    LABS: Pending    PROCEDURES/IMAGING:  Chest X-Ray: Not done  Angio: Pending  Echo: No aortic stenosis or aortic insufficiency  CT: Enlarging ascending aorta  MRI: Not done  Carotid US: Not done     ASSESSMENT/PLAN:   Raciel is a 56 year old gentleman with an enlarging ascending aortic aneurysm.  We do not have a copy of his most recent CT.  I believe that he would benefit from replacement of his ascending aorta with an interposition graft.  He understands that the risks for this procedure include: bleeding, infection, stroke sternal dehiscence, myocardial infarction, arrhythmias, reoperation, pneumonia, prolonged ventilation, liver/renal failure, aortic dissection, and an operative mortality of 2 to 4 percent.  He accepts these risks and is agreeable with the plan of having the surgery, but wants to wait until after his son's wedding on June 22, 2022.    1. Coronary angiography as an outpatient  2. Replacement of ascending aorta on 7-6-2022     Approximately 35 minutes were spent with the patient in clinic at this visit.    CC  Patient Care Team:  Sean Mendes PA-C as PCP - Cristiana Gomez MD as Assigned Heart and Vascular Provider  SELF, REFERRED    Thank you for allowing me to participate in the care of your patient.      Sincerely,     Junie Montoya MD   [facility]  cc: Referred Self,

## 2022-04-29 ENCOUNTER — HOSPITAL ENCOUNTER (OUTPATIENT)
Facility: HOSPITAL | Age: 57
End: 2022-04-29
Attending: INTERNAL MEDICINE | Admitting: INTERNAL MEDICINE
Payer: COMMERCIAL

## 2022-04-29 DIAGNOSIS — I71.20 THORACIC AORTIC ANEURYSM WITHOUT RUPTURE (H): ICD-10-CM

## 2022-05-02 ENCOUNTER — TELEPHONE (OUTPATIENT)
Dept: CARDIOLOGY | Facility: CLINIC | Age: 57
End: 2022-05-02
Payer: COMMERCIAL

## 2022-05-02 ENCOUNTER — PREP FOR PROCEDURE (OUTPATIENT)
Dept: CARDIOLOGY | Facility: CLINIC | Age: 57
End: 2022-05-02
Payer: COMMERCIAL

## 2022-05-02 DIAGNOSIS — I71.20 THORACIC AORTIC ANEURYSM WITHOUT RUPTURE (H): Primary | ICD-10-CM

## 2022-05-02 DIAGNOSIS — I71.20 ANEURYSM OF THORACIC AORTA (H): Primary | ICD-10-CM

## 2022-05-02 RX ORDER — FENTANYL CITRATE 50 UG/ML
25 INJECTION, SOLUTION INTRAMUSCULAR; INTRAVENOUS
Status: CANCELLED | OUTPATIENT
Start: 2022-05-02

## 2022-05-02 RX ORDER — LIDOCAINE 40 MG/G
CREAM TOPICAL
Status: CANCELLED | OUTPATIENT
Start: 2022-05-02

## 2022-05-02 RX ORDER — SODIUM CHLORIDE 9 MG/ML
INJECTION, SOLUTION INTRAVENOUS CONTINUOUS
Status: CANCELLED | OUTPATIENT
Start: 2022-05-02

## 2022-05-02 RX ORDER — ASPIRIN 325 MG
325 TABLET ORAL ONCE
Status: CANCELLED | OUTPATIENT
Start: 2022-05-02 | End: 2022-05-02

## 2022-05-02 RX ORDER — ASPIRIN 81 MG/1
243 TABLET, CHEWABLE ORAL ONCE
Status: CANCELLED | OUTPATIENT
Start: 2022-05-02

## 2022-05-02 NOTE — TELEPHONE ENCOUNTER
Raciel Zaidi  [address]  [place] [address]  [phone] (home)     PCP:  Sean Mendes O  H&P completed by:  SHEYLA  Admit date 6/27 Arrival time:  0600 AM  Anticoagulation:  NA  Previous PCI: No  Bypass Grafts: No  Renal Issues: No  Diabetic?: No  Device?: No  Type:  NA  Ambulation status: INDEPENDENT    Reason for Visit:  Patient seen for pre-procedure education in preparation for: REPAIR OF ASCENDING AORTIC ANUERYSM    Procedure Prep:  EKG results obtained, dated: To be completed on day of cath lab procedure  Hemogram results obtained: To be completed on day of cath lab procedure  Basic Metabolic Panel results obtained: To be completed on day of cath lab procedure  Pertinent cardiac test results: CTA  COVID-19 test results obtained: Completed within [facility]     Patient Education    1. Your arrival time is 6:00 AM.  [place] [address] - Main Entrance of the Hospital  2. Please plan on being at the hospital all day.  3. At any time, emergencies and/or urgent cases may come up which could delay the start of your procedure.    COVID Testing Instructions  *Mandatory COVID Testing:   ALL Patients will need to complete a COVID test no sooner than 4 days prior to their procedure (regardless of vaccination status).      To schedule COVID testing Please call [phone]    If you want to complete this at an outside facility please call them to find out if they will have the results within the appropriate time frame and their fax number.  You will need to provide us with that information so we can send the order.    The facility completing the test will need to fax the results to [phone]    If you are running into and issues please call us.     Pre-procedure instructions  Take your temperature in the morning prior to coming in.  If your temperature is 100 F please call [facility] [phone] and notify them.  If you do  not have access to a thermometer at home, please come in for testing.  If you are running a temperature your procedure may be rescheduled.  Patient instructed to not Eat or Drink after midnight.  Patient instructed to shower the evening before or the morning of the procedure.  Patient instructed to arrange for transportation home following procedure from a responsible family member or friend. No driving for at least 24 hours.  Patient instructed to have a responsible adult with them for 24 hours post-procedure.  Post-procedure follow up process.  Conscious sedation discussed.      Pre-procedure medication instructions.  Continue medications as scheduled, with a small amount of water on the day of the procedure unless indicated. (NO Diabetic Medications or Blood Thinners)  Pt instructed not to consume Alcohol, Tobacco, Caffeine, or Carbonated beverages 12 hours prior to procedure.  Patient instructed to take 325 mg of Aspirin the night before and morning of procedure: Yes  Other medication: instructed to only take METOPROLOL a.m. of the procedure.              Diabetic Medication Instructions  ? DO NOT take any oral diabetic medication, short-acting diabetes medications/insulin, humalog or regular insulin the morning of your test  ? Take   dose of long-acting insulin (Lantus, Levemir) the day of your test  ? Hold Oral Diabetic on the day of the procedure and for 48 hours after IV contrast given  ? Remember to  bring your glucometer and insulin with you to take after your test if needed              Anticoagulation Medication Instructions     NA    Patient states understanding of procedure and agrees to proceed.    *PATIENTS RECORDS AVAILABLE IN [employer] UNLESS OTHERWISE INDICATED*      There is no problem list on file for this patient.      Current Outpatient Medications   Medication Sig Dispense Refill     cholecalciferol (D3 SUPER STRENGTH) 50 MCG (2000 UT) CAPS Take 2,000 Units by mouth daily       Cod Liver Oil 1000  MG CAPS Take 1,000 mg by mouth daily       coenzyme Q-10 100 MG CAPS Take 100 mg by mouth daily       Fish Oil-Krill Oil (MEGARED ADVANCED 4 IN 1) CAPS Take 1 capsule by mouth daily       hydrochlorothiazide (HYDRODIURIL) 12.5 MG tablet Take 12.5 mg by mouth daily       hydrOXYzine (ATARAX) 50 MG tablet Take 50 mg by mouth as needed       levothyroxine (SYNTHROID/LEVOTHROID) 125 MCG tablet Take 125 mcg by mouth daily       metoprolol succinate ER (TOPROL XL) 25 MG 24 hr tablet Take 1 tablet (25 mg) by mouth daily 30 tablet 3     Multiple Vitamin (MULTI VITAMIN) TABS Take 1 tablet by mouth daily       simvastatin (ZOCOR) 40 MG tablet Take 40 mg by mouth daily       vitamin C (ASCORBIC ACID) 1000 MG TABS Take 1,000 mg by mouth daily       zolpidem (AMBIEN) 10 mg tablet [ZOLPIDEM (AMBIEN) 10 MG TABLET] Take 10 mg by mouth bedtime as needed for sleep.         Allergies   Allergen Reactions     Cat Dander [Animal Dander] Other (See Comments)     Itchy eyes, runny nose, scratchy throat.     Ragweed [Ragweeds] Other (See Comments)     Itchy eyes, runny nose, scratchy throat.       Michelle Erickson RN on 5/2/2022 at 10:35 AM

## 2022-05-03 ENCOUNTER — HOSPITAL ENCOUNTER (INPATIENT)
Facility: HOSPITAL | Age: 57
Setting detail: SURGERY ADMIT
End: 2022-05-03
Attending: THORACIC SURGERY (CARDIOTHORACIC VASCULAR SURGERY) | Admitting: THORACIC SURGERY (CARDIOTHORACIC VASCULAR SURGERY)
Payer: COMMERCIAL

## 2022-05-03 ENCOUNTER — TELEPHONE (OUTPATIENT)
Dept: ADMINISTRATIVE | Facility: CLINIC | Age: 57
End: 2022-05-03
Payer: COMMERCIAL

## 2022-05-03 DIAGNOSIS — I71.20 THORACIC AORTIC ANEURYSM WITHOUT RUPTURE (H): Primary | ICD-10-CM

## 2022-05-03 RX ORDER — CEFAZOLIN SODIUM 2 G/100ML
2 INJECTION, SOLUTION INTRAVENOUS SEE ADMIN INSTRUCTIONS
Status: CANCELLED | OUTPATIENT
Start: 2022-05-03

## 2022-05-03 RX ORDER — PHENYLEPHRINE HCL IN 0.9% NACL 50MG/250ML
.1-6 PLASTIC BAG, INJECTION (ML) INTRAVENOUS CONTINUOUS
Status: CANCELLED | OUTPATIENT
Start: 2022-05-03

## 2022-05-03 RX ORDER — ASPIRIN 81 MG/1
81 TABLET, CHEWABLE ORAL
Status: CANCELLED | OUTPATIENT
Start: 2022-05-03

## 2022-05-03 RX ORDER — LIDOCAINE 40 MG/G
CREAM TOPICAL
Status: CANCELLED | OUTPATIENT
Start: 2022-05-03

## 2022-05-03 RX ORDER — CEFAZOLIN SODIUM 2 G/100ML
2 INJECTION, SOLUTION INTRAVENOUS
Status: CANCELLED | OUTPATIENT
Start: 2022-05-03

## 2022-05-03 RX ORDER — SODIUM CHLORIDE, SODIUM GLUCONATE, SODIUM ACETATE, POTASSIUM CHLORIDE AND MAGNESIUM CHLORIDE 526; 502; 368; 37; 30 MG/100ML; MG/100ML; MG/100ML; MG/100ML; MG/100ML
1000 INJECTION, SOLUTION INTRAVENOUS
Status: CANCELLED | OUTPATIENT
Start: 2022-05-03 | End: 2022-05-03

## 2022-05-03 RX ORDER — CHLORHEXIDINE GLUCONATE ORAL RINSE 1.2 MG/ML
10 SOLUTION DENTAL ONCE
Status: CANCELLED | OUTPATIENT
Start: 2022-05-03 | End: 2022-05-03

## 2022-05-03 RX ORDER — DEXMEDETOMIDINE HYDROCHLORIDE 4 UG/ML
0.2-1.2 INJECTION, SOLUTION INTRAVENOUS CONTINUOUS
Status: CANCELLED | OUTPATIENT
Start: 2022-05-03

## 2022-05-03 RX ORDER — ASPIRIN 81 MG/1
162 TABLET, CHEWABLE ORAL
Status: CANCELLED | OUTPATIENT
Start: 2022-05-03

## 2022-05-03 NOTE — TELEPHONE ENCOUNTER
Surgery with Dr Montoya is scheduled for 7/6/22 at [facility]. Spoke with patient & Kailee RN charge nurse in [facility] to schedule PAT appt on 7/1/22. Will also have EKG & chest xray. COVID test will be scheduled closer to the date of surgery

## 2022-05-04 ENCOUNTER — TELEPHONE (OUTPATIENT)
Dept: CARDIOLOGY | Facility: CLINIC | Age: 57
End: 2022-05-04
Payer: COMMERCIAL

## 2022-05-04 NOTE — TELEPHONE ENCOUNTER
Letter by Michelle Erickson RN on 5/4/2022       Cardiovascular Surgery     [facility]       CARDIOVASCULAR SURGERY INSTRUCTIONS     Your Heart Surgery is scheduled with Dr. Montoya on Wednesday, July 6.  Please arrive at 5:00 AM for your surgery scheduled at 7:10 AM.     Here are instructions for your upcoming surgery and clinic visit if scheduled.     Report to the information desk at the main entrance of [facility] at [address] [place] [address]. When you walk in the main entrance of the hospital, it is directly in front of you. Ask for an escort or the  can help direct you. You will then be escorted or directed to the Surgical Admission Unit ([facility]) on the second floor for your surgery preparation. You have been pre-registered.      Family/friends will be called from the OR with updates 1-2 times during the surgery.  After the surgery is completed, the surgeon will speak to your family members or friends face to face or by calling them.       At this time due to COVID-19, only two visitors a day will be allowed per adult patient for the patient's hospital admission.  Surgical patients can have one visitor only during the preoperative phase, and one person may escort an adult patient to their outpatient clinic appointments.  All visitors will be screened, each time they visit, and must pass screening before entry.  Individuals who are sick and not seeking care or have known exposure to COVID-19 are not allowed to visit.  Visitors under the age of 18 are not allowed to enter. Hospital visiting hours are 8:00 am to 8:30 pm. Visiting policies will be strictly enforced.  This policy is subject to change as the COVID virus continues to evolve.      You will spend approximately 5 - 7 days in the hospital, depending on how quickly you recover.       COVID-19 TESTING  You will be required to undergo COVID-19 testing prior to your surgery.  Please call [phone] to schedule an  appointment. You must schedule it WITHIN 4 DAYS of your surgery. A COVID-19 test performed more than 4 days prior will not be counted as your pre-op COVID-19 test.    After the COVID test, please protect yourself by following the CDC recommendations for disease prevention.  Wash your hands regularly with soap and water and use hand  if soap and water is not available, wear a face mask. Separate yourself from others by 6 feet and keep your hands away from your face.      INSTRUCTIONS PRIOR TO SURGERY      MEDICATIONS:     ASPIRIN is okay to take on the day of your surgery if you are having bypass surgery. Do not take your aspirin on your day of surgery if you are scheduled for valve or aortic surgery. If you do not take aspirin, do not start aspirin unless instructed otherwise. Take your other medications as you normally would until the day of surgery unless instructed otherwise.       IF you are taking a blood thinner, (Coumadin, Warfarin, Plavix, Pradaxa, Effient, Brilinta, Pletal, Eliquis, Xarelto or other antiplatelet), please inform your surgery team as soon as possible as  these medications may need to be stopped for several days (3-7) prior to your surgery.     STOP TAKING these medications. STOP ALL ANTIINFLAMMATORY MEDICATIONS: (Ibuprofen, Aleve, Advil, Celebrex, Votaren, Ketoprofen, and Naproxen).  Stop ALL SUPPLEMENTS 10 days prior to your surgery.   This includes stopping Co-Q 10, vitamin E, and all fish oil supplements.   Please check the labels on any OTC eye vitamins you may be taking.  They often contain vitamin E and should be stopped 10 days prior to surgery.      MEDICATIONS THE MORNING OF SURGERY:  Take your METOPROLOL the morning of your surgery with a drink of clear liquid.  Please tell your anesthesiologist what medication you took and at what time.     DO NOT EAT ANY SOLID FOODS AFTER MIDNIGHT or the morning of your surgery. NO MILK, MILK PRODUCTS, SMOOTHIES 8 HOURS PRIOR TO  SURGERY.      DO NOT EAT ANYTHING, however you may have any clear liquids; black coffee (sugar okay), clear tea, water, sprite, ginger ale, apple juice, Gatorade or any clear liquid up to two hours before your surgery time. (No fadia tea) No milk, or milk products, no smoothies or juice with pulp.        HISTORY AND PHYSICAL:  LABS and IMAGING  All blood work, tests, and procedures must be within 30 days of your surgery date.  You do NOT need to fast for the blood work.    Please schedule a history and physical with your primary doctor within 30 days of surgery.     You have a Pre-Admission Testing (PAT) appointment beginning at 8:00 AM to 10:00 AM on Friday, July 1 in the Surgical Admission Unit ([facility]) at [facility], [facility] [place] [address]. Please check in at the information desk in main entrance lobby.  You will be directed to the second floor.  Radiology is located downstairs in the lower level of the hospital.      Your schedule for 7/1 is as follows:     PAT appointment at 8:00 AM  EKG at 10:00 AM  Chest xray at 10:25 AM     BELONGINGS  Do not bring personal belongings, jewelry, money, valuables, toiletries or medications to the hospital the morning of your surgery. You may pack a bag and give it to a family member or friend to bring the following day or when needed.  Things you may want to pack or have brought to you later may include slippers/shoes with a sole that are easy to take on/off, and comfy pajama bottoms to wear while walking the halls of the hospital.  Please remove all jewelry, including body piercings. Cautery instruments are used during surgery that may pose a risk of burns if a body piercing is left in during surgery.      Do bring a photo ID and insurance card.  A copy of your health care directives, if you have one.  Glasses and hearing aids (bring cases).  You cannot wear contact lenses during the surgery.  Inhaler(s) and eye drops if you use them, tell the staff about them  when you arrive. Bring your CPAP machine or breathing device, if you use them.  If you have a pacemaker or ICD (cardiac defibrillator) bring the ID card.  If you have an implanted stimulator, bring the remote control.  If you are a legal guardian bring a copy of the certified (court-stamped) guardianship papers.      Call Anaya our surgical scheduler, with any questions or concerns about scheduling. She can be reached by phone at [phone] between 8:00 AM and 4:00 PM Monday through Friday.      If you have questions about your medications, test results or have a change in your health status, call your surgery coordinator IAN Martinez during regular business hours.  Call Michelle or the afterhours number ([phone]) if you develop any of the following symptoms; fever, cough, shortness of breath, sore throat, runny or stuffy nose, muscle or body aches, headaches, fatigue, vomiting or diarrhea.       On weekends or after 4:00 PM, please call [phone] and ask the  to page the Cardiovascular Surgery staff on call.      PLEASE NOTE, there are times elective surgery (like yours) needs to be rescheduled due to unplanned emergency, transplant, or urgent surgeries. Your surgery may also be postponed or cancelled if there are no ICU beds available in the hospital.  If that should happen, your surgery will be rescheduled as quickly as possible. Our surgery team appreciates your understanding.      Please call me with any questions.  Thank you,  Michelle Erickson RN  Cardiovascular Surgery Coordinator  [phone]  Direct Phone  [phone]  Fax

## 2022-06-03 ENCOUNTER — TELEPHONE (OUTPATIENT)
Dept: ADMINISTRATIVE | Facility: CLINIC | Age: 57
End: 2022-06-03
Payer: COMMERCIAL

## 2022-06-03 NOTE — TELEPHONE ENCOUNTER
Mr Dejuan is getting another opinion in the [facility] system and he will call us to let us know if he wants to reschedule here or if he wishes to switch care to [facility]

## 2022-06-06 ENCOUNTER — TELEPHONE (OUTPATIENT)
Dept: CARDIOLOGY | Facility: CLINIC | Age: 57
End: 2022-06-06
Payer: COMMERCIAL

## 2022-06-06 NOTE — TELEPHONE ENCOUNTER
----- Message from Yudy Royer sent at 6/3/2022  9:30 AM CDT -----  Regarding: Patient cancelling all future appointments, doesn't want to reschedule for now  Good morning,    Patient called [facility] today to cancel his preadmission appointment for 7/1/22. A nurse from [facility] called to let me know.    I called him to ask if he wished to reschedule and he said he wants to cancel all upcoming appointments including surgery on 7/6/22.     I saw he was due for a visit in July with Dr Jean and he didn't want to schedule that either.    He's going to [facility] for another opinion and he told me they're repeating a CT. I asked him to call us back to let us know if he wishes to switch care to [facility], just so he doesn't get lost to follow up. He said he'll do that.    I'll put a phone note in the chart.    Thank you

## 2022-07-17 ENCOUNTER — HEALTH MAINTENANCE LETTER (OUTPATIENT)
Age: 57
End: 2022-07-17

## 2022-07-25 DIAGNOSIS — I71.20 THORACIC AORTIC ANEURYSM WITHOUT RUPTURE (H): ICD-10-CM

## 2022-07-25 RX ORDER — METOPROLOL SUCCINATE 25 MG/1
TABLET, EXTENDED RELEASE ORAL
Qty: 30 TABLET | Refills: 3 | Status: SHIPPED | OUTPATIENT
Start: 2022-07-25

## 2022-08-09 ENCOUNTER — LAB REQUISITION (OUTPATIENT)
Dept: LAB | Facility: CLINIC | Age: 57
End: 2022-08-09
Payer: COMMERCIAL

## 2022-08-09 DIAGNOSIS — Z01.818 ENCOUNTER FOR OTHER PREPROCEDURAL EXAMINATION: ICD-10-CM

## 2022-08-09 LAB — SARS-COV-2 RNA RESP QL NAA+PROBE: NEGATIVE

## 2022-08-09 PROCEDURE — U0003 INFECTIOUS AGENT DETECTION BY NUCLEIC ACID (DNA OR RNA); SEVERE ACUTE RESPIRATORY SYNDROME CORONAVIRUS 2 (SARS-COV-2) (CORONAVIRUS DISEASE [COVID-19]), AMPLIFIED PROBE TECHNIQUE, MAKING USE OF HIGH THROUGHPUT TECHNOLOGIES AS DESCRIBED BY CMS-2020-01-R: HCPCS | Mod: ORL | Performed by: STUDENT IN AN ORGANIZED HEALTH CARE EDUCATION/TRAINING PROGRAM

## 2022-09-25 ENCOUNTER — HEALTH MAINTENANCE LETTER (OUTPATIENT)
Age: 57
End: 2022-09-25

## 2022-11-03 ENCOUNTER — TRANSCRIBE ORDERS (OUTPATIENT)
Dept: CARDIAC REHAB | Facility: CLINIC | Age: 57
End: 2022-11-03

## 2022-11-03 DIAGNOSIS — Z95.2 S/P AVR (AORTIC VALVE REPLACEMENT): Primary | ICD-10-CM

## 2022-11-04 ENCOUNTER — HOSPITAL ENCOUNTER (OUTPATIENT)
Dept: CARDIAC REHAB | Facility: CLINIC | Age: 57
Discharge: HOME | End: 2022-11-04
Attending: INTERNAL MEDICINE
Payer: COMMERCIAL

## 2022-11-04 DIAGNOSIS — Z95.2 S/P AVR (AORTIC VALVE REPLACEMENT): ICD-10-CM

## 2022-11-04 PROCEDURE — 93798 PHYS/QHP OP CAR RHAB W/ECG: CPT

## 2022-11-04 PROCEDURE — 93797 PHYS/QHP OP CAR RHAB WO ECG: CPT

## 2022-11-07 ENCOUNTER — HOSPITAL ENCOUNTER (OUTPATIENT)
Dept: CARDIAC REHAB | Facility: CLINIC | Age: 57
Discharge: HOME | End: 2022-11-07
Attending: INTERNAL MEDICINE
Payer: COMMERCIAL

## 2022-11-07 PROCEDURE — 93798 PHYS/QHP OP CAR RHAB W/ECG: CPT

## 2022-11-09 ENCOUNTER — HOSPITAL ENCOUNTER (OUTPATIENT)
Dept: CARDIAC REHAB | Facility: CLINIC | Age: 57
Discharge: HOME | End: 2022-11-09
Attending: INTERNAL MEDICINE
Payer: COMMERCIAL

## 2022-11-09 PROCEDURE — 93798 PHYS/QHP OP CAR RHAB W/ECG: CPT

## 2022-11-10 ENCOUNTER — HOSPITAL ENCOUNTER (OUTPATIENT)
Dept: CARDIAC REHAB | Facility: CLINIC | Age: 57
Discharge: HOME | End: 2022-11-10
Attending: INTERNAL MEDICINE
Payer: COMMERCIAL

## 2022-11-10 PROCEDURE — 93798 PHYS/QHP OP CAR RHAB W/ECG: CPT

## 2022-11-14 ENCOUNTER — HOSPITAL ENCOUNTER (OUTPATIENT)
Dept: CARDIAC REHAB | Facility: CLINIC | Age: 57
Discharge: HOME | End: 2022-11-14
Attending: INTERNAL MEDICINE
Payer: COMMERCIAL

## 2022-11-14 PROCEDURE — 93798 PHYS/QHP OP CAR RHAB W/ECG: CPT

## 2022-11-16 ENCOUNTER — HOSPITAL ENCOUNTER (OUTPATIENT)
Dept: CARDIAC REHAB | Facility: CLINIC | Age: 57
Discharge: HOME | End: 2022-11-16
Attending: INTERNAL MEDICINE
Payer: COMMERCIAL

## 2022-11-16 PROCEDURE — 93798 PHYS/QHP OP CAR RHAB W/ECG: CPT

## 2022-11-18 ENCOUNTER — HOSPITAL ENCOUNTER (OUTPATIENT)
Dept: CARDIAC REHAB | Facility: CLINIC | Age: 57
Discharge: HOME | End: 2022-11-18
Attending: INTERNAL MEDICINE
Payer: COMMERCIAL

## 2022-11-18 PROCEDURE — 93798 PHYS/QHP OP CAR RHAB W/ECG: CPT

## 2022-11-21 ENCOUNTER — HOSPITAL ENCOUNTER (OUTPATIENT)
Dept: CARDIAC REHAB | Facility: CLINIC | Age: 57
Discharge: HOME | End: 2022-11-21
Attending: INTERNAL MEDICINE
Payer: COMMERCIAL

## 2022-11-21 PROCEDURE — 93798 PHYS/QHP OP CAR RHAB W/ECG: CPT

## 2022-11-23 ENCOUNTER — HOSPITAL ENCOUNTER (OUTPATIENT)
Dept: CARDIAC REHAB | Facility: CLINIC | Age: 57
Discharge: HOME | End: 2022-11-23
Attending: INTERNAL MEDICINE
Payer: COMMERCIAL

## 2022-11-23 PROCEDURE — 93798 PHYS/QHP OP CAR RHAB W/ECG: CPT

## 2022-11-25 ENCOUNTER — HOSPITAL ENCOUNTER (OUTPATIENT)
Dept: CARDIAC REHAB | Facility: CLINIC | Age: 57
Discharge: HOME | End: 2022-11-25
Attending: INTERNAL MEDICINE
Payer: COMMERCIAL

## 2022-11-25 PROCEDURE — 93798 PHYS/QHP OP CAR RHAB W/ECG: CPT

## 2022-11-28 ENCOUNTER — HOSPITAL ENCOUNTER (OUTPATIENT)
Dept: CARDIAC REHAB | Facility: CLINIC | Age: 57
Discharge: HOME | End: 2022-11-28
Attending: INTERNAL MEDICINE
Payer: COMMERCIAL

## 2022-11-28 PROCEDURE — 93798 PHYS/QHP OP CAR RHAB W/ECG: CPT

## 2022-11-30 ENCOUNTER — HOSPITAL ENCOUNTER (OUTPATIENT)
Dept: CARDIAC REHAB | Facility: CLINIC | Age: 57
Discharge: HOME | End: 2022-11-30
Attending: INTERNAL MEDICINE
Payer: COMMERCIAL

## 2022-11-30 PROCEDURE — 93798 PHYS/QHP OP CAR RHAB W/ECG: CPT

## 2022-12-02 ENCOUNTER — HOSPITAL ENCOUNTER (OUTPATIENT)
Dept: CARDIAC REHAB | Facility: CLINIC | Age: 57
Discharge: HOME | End: 2022-12-02
Attending: INTERNAL MEDICINE
Payer: COMMERCIAL

## 2022-12-02 PROCEDURE — 93798 PHYS/QHP OP CAR RHAB W/ECG: CPT

## 2022-12-05 ENCOUNTER — HOSPITAL ENCOUNTER (OUTPATIENT)
Dept: CARDIAC REHAB | Facility: CLINIC | Age: 57
Discharge: HOME | End: 2022-12-05
Attending: INTERNAL MEDICINE
Payer: COMMERCIAL

## 2022-12-05 PROCEDURE — 93798 PHYS/QHP OP CAR RHAB W/ECG: CPT

## 2022-12-07 ENCOUNTER — HOSPITAL ENCOUNTER (OUTPATIENT)
Dept: CARDIAC REHAB | Facility: CLINIC | Age: 57
Discharge: HOME | End: 2022-12-07
Attending: INTERNAL MEDICINE
Payer: COMMERCIAL

## 2022-12-07 PROCEDURE — 93798 PHYS/QHP OP CAR RHAB W/ECG: CPT

## 2022-12-09 ENCOUNTER — HOSPITAL ENCOUNTER (OUTPATIENT)
Dept: CARDIAC REHAB | Facility: CLINIC | Age: 57
Discharge: HOME | End: 2022-12-09
Attending: INTERNAL MEDICINE
Payer: COMMERCIAL

## 2022-12-09 PROCEDURE — 93798 PHYS/QHP OP CAR RHAB W/ECG: CPT

## 2022-12-12 ENCOUNTER — HOSPITAL ENCOUNTER (OUTPATIENT)
Dept: CARDIAC REHAB | Facility: CLINIC | Age: 57
Discharge: HOME | End: 2022-12-12
Attending: INTERNAL MEDICINE
Payer: COMMERCIAL

## 2022-12-12 PROCEDURE — 93798 PHYS/QHP OP CAR RHAB W/ECG: CPT

## 2022-12-14 ENCOUNTER — HOSPITAL ENCOUNTER (OUTPATIENT)
Dept: CARDIAC REHAB | Facility: CLINIC | Age: 57
Discharge: HOME | End: 2022-12-14
Attending: INTERNAL MEDICINE
Payer: COMMERCIAL

## 2022-12-14 PROCEDURE — 93798 PHYS/QHP OP CAR RHAB W/ECG: CPT

## 2022-12-16 ENCOUNTER — HOSPITAL ENCOUNTER (OUTPATIENT)
Dept: CARDIAC REHAB | Facility: CLINIC | Age: 57
Discharge: HOME | End: 2022-12-16
Attending: INTERNAL MEDICINE
Payer: COMMERCIAL

## 2022-12-16 PROCEDURE — 93798 PHYS/QHP OP CAR RHAB W/ECG: CPT

## 2022-12-19 ENCOUNTER — HOSPITAL ENCOUNTER (OUTPATIENT)
Dept: CARDIAC REHAB | Facility: CLINIC | Age: 57
Discharge: HOME | End: 2022-12-19
Attending: INTERNAL MEDICINE
Payer: COMMERCIAL

## 2022-12-19 PROCEDURE — 93798 PHYS/QHP OP CAR RHAB W/ECG: CPT

## 2022-12-23 ENCOUNTER — HOSPITAL ENCOUNTER (OUTPATIENT)
Dept: CARDIAC REHAB | Facility: CLINIC | Age: 57
Discharge: HOME | End: 2022-12-23
Attending: INTERNAL MEDICINE
Payer: COMMERCIAL

## 2022-12-23 PROCEDURE — 93798 PHYS/QHP OP CAR RHAB W/ECG: CPT

## 2022-12-28 ENCOUNTER — HOSPITAL ENCOUNTER (OUTPATIENT)
Dept: CARDIAC REHAB | Facility: CLINIC | Age: 57
Discharge: HOME | End: 2022-12-28
Attending: INTERNAL MEDICINE
Payer: COMMERCIAL

## 2022-12-28 PROCEDURE — 93798 PHYS/QHP OP CAR RHAB W/ECG: CPT

## 2022-12-30 ENCOUNTER — HOSPITAL ENCOUNTER (OUTPATIENT)
Dept: CARDIAC REHAB | Facility: CLINIC | Age: 57
Discharge: HOME | End: 2022-12-30
Attending: INTERNAL MEDICINE
Payer: COMMERCIAL

## 2022-12-30 PROCEDURE — 93798 PHYS/QHP OP CAR RHAB W/ECG: CPT

## 2022-12-30 PROCEDURE — 93797 PHYS/QHP OP CAR RHAB WO ECG: CPT | Mod: 59

## 2023-08-05 ENCOUNTER — HEALTH MAINTENANCE LETTER (OUTPATIENT)
Age: 58
End: 2023-08-05

## 2024-06-07 ENCOUNTER — LAB REQUISITION (OUTPATIENT)
Dept: LAB | Facility: CLINIC | Age: 59
End: 2024-06-07

## 2024-06-07 DIAGNOSIS — Z12.5 ENCOUNTER FOR SCREENING FOR MALIGNANT NEOPLASM OF PROSTATE: ICD-10-CM

## 2024-06-07 PROCEDURE — G0103 PSA SCREENING: HCPCS | Performed by: STUDENT IN AN ORGANIZED HEALTH CARE EDUCATION/TRAINING PROGRAM

## 2024-06-08 LAB — PSA SERPL DL<=0.01 NG/ML-MCNC: 0.38 NG/ML (ref 0–4.5)

## 2024-08-14 ENCOUNTER — LAB REQUISITION (OUTPATIENT)
Dept: LAB | Facility: CLINIC | Age: 59
End: 2024-08-14

## 2024-08-14 DIAGNOSIS — E03.9 HYPOTHYROIDISM, UNSPECIFIED: ICD-10-CM

## 2024-08-14 DIAGNOSIS — I11.9 HYPERTENSIVE HEART DISEASE WITHOUT HEART FAILURE: ICD-10-CM

## 2024-08-14 LAB
ALBUMIN SERPL BCG-MCNC: 4.4 G/DL (ref 3.5–5.2)
ALP SERPL-CCNC: 43 U/L (ref 40–150)
ALT SERPL W P-5'-P-CCNC: 31 U/L (ref 0–70)
ANION GAP SERPL CALCULATED.3IONS-SCNC: 10 MMOL/L (ref 7–15)
AST SERPL W P-5'-P-CCNC: 27 U/L (ref 0–45)
BILIRUB SERPL-MCNC: 0.6 MG/DL
BUN SERPL-MCNC: 9.6 MG/DL (ref 8–23)
CALCIUM SERPL-MCNC: 10.2 MG/DL (ref 8.8–10.4)
CHLORIDE SERPL-SCNC: 102 MMOL/L (ref 98–107)
CREAT SERPL-MCNC: 1.07 MG/DL (ref 0.67–1.17)
EGFRCR SERPLBLD CKD-EPI 2021: 79 ML/MIN/1.73M2
GLUCOSE SERPL-MCNC: 99 MG/DL (ref 70–99)
HCO3 SERPL-SCNC: 28 MMOL/L (ref 22–29)
POTASSIUM SERPL-SCNC: 4.6 MMOL/L (ref 3.4–5.3)
PROT SERPL-MCNC: 7.6 G/DL (ref 6.4–8.3)
SODIUM SERPL-SCNC: 140 MMOL/L (ref 135–145)
TSH SERPL DL<=0.005 MIU/L-ACNC: 0.71 UIU/ML (ref 0.3–4.2)

## 2024-08-14 PROCEDURE — 80053 COMPREHEN METABOLIC PANEL: CPT | Performed by: STUDENT IN AN ORGANIZED HEALTH CARE EDUCATION/TRAINING PROGRAM

## 2024-08-14 PROCEDURE — 84443 ASSAY THYROID STIM HORMONE: CPT | Performed by: STUDENT IN AN ORGANIZED HEALTH CARE EDUCATION/TRAINING PROGRAM

## 2024-09-28 ENCOUNTER — HEALTH MAINTENANCE LETTER (OUTPATIENT)
Age: 59
End: 2024-09-28